# Patient Record
Sex: FEMALE | Race: BLACK OR AFRICAN AMERICAN | NOT HISPANIC OR LATINO | Employment: OTHER | URBAN - METROPOLITAN AREA
[De-identification: names, ages, dates, MRNs, and addresses within clinical notes are randomized per-mention and may not be internally consistent; named-entity substitution may affect disease eponyms.]

---

## 2020-03-17 ENCOUNTER — HOSPITAL ENCOUNTER (EMERGENCY)
Facility: HOSPITAL | Age: 55
Discharge: HOME | End: 2020-03-17
Attending: EMERGENCY MEDICINE | Admitting: EMERGENCY MEDICINE

## 2020-03-17 VITALS
HEIGHT: 67 IN | OXYGEN SATURATION: 99 % | BODY MASS INDEX: 39.08 KG/M2 | WEIGHT: 249 LBS | TEMPERATURE: 98.4 F | HEART RATE: 60 BPM | SYSTOLIC BLOOD PRESSURE: 158 MMHG | DIASTOLIC BLOOD PRESSURE: 98 MMHG | RESPIRATION RATE: 18 BRPM

## 2020-03-17 DIAGNOSIS — T78.3XXA ACE INHIBITOR-AGGRAVATED ANGIOEDEMA, INITIAL ENCOUNTER: Primary | ICD-10-CM

## 2020-03-17 DIAGNOSIS — T46.4X5A ACE INHIBITOR-AGGRAVATED ANGIOEDEMA, INITIAL ENCOUNTER: Primary | ICD-10-CM

## 2020-03-17 PROBLEM — I49.9 IRREGULAR HEART BEAT: Status: ACTIVE | Noted: 2020-03-17

## 2020-03-17 PROBLEM — E11.22 TYPE 2 DIABETES MELLITUS WITH CHRONIC KIDNEY DISEASE, WITHOUT LONG-TERM CURRENT USE OF INSULIN (HCC): Status: ACTIVE | Noted: 2020-03-17

## 2020-03-17 PROBLEM — Z98.84 HX OF GASTRIC BYPASS: Status: ACTIVE | Noted: 2020-03-17

## 2020-03-17 PROBLEM — K76.9 LIVER DISEASE, UNSPECIFIED: Status: ACTIVE | Noted: 2020-03-17

## 2020-03-17 PROBLEM — Z90.13 HISTORY OF MASTECTOMY, BILATERAL: Status: ACTIVE | Noted: 2020-03-17

## 2020-03-17 PROBLEM — I10 ESSENTIAL HYPERTENSION: Status: ACTIVE | Noted: 2020-03-17

## 2020-03-17 PROBLEM — Z85.3 HISTORY OF BREAST CANCER: Status: ACTIVE | Noted: 2020-03-17

## 2020-03-17 PROBLEM — Z98.890 HISTORY OF BACK SURGERY: Status: ACTIVE | Noted: 2020-03-17

## 2020-03-17 LAB
ALBUMIN SERPL-MCNC: 4 G/DL (ref 3.5–5.2)
ALBUMIN/GLOB SERPL: 1.3 G/DL
ALP SERPL-CCNC: 114 U/L (ref 39–117)
ALT SERPL W P-5'-P-CCNC: 17 U/L (ref 1–33)
ANION GAP SERPL CALCULATED.3IONS-SCNC: 11.1 MMOL/L (ref 5–15)
ANION GAP SERPL CALCULATED.3IONS-SCNC: 12 MMOL/L (ref 5–15)
AST SERPL-CCNC: 21 U/L (ref 1–32)
BASOPHILS # BLD AUTO: 0.02 10*3/MM3 (ref 0–0.2)
BASOPHILS NFR BLD AUTO: 0.3 % (ref 0–1.5)
BILIRUB SERPL-MCNC: 0.3 MG/DL (ref 0.2–1.2)
BUN BLD-MCNC: 16 MG/DL (ref 6–20)
BUN BLD-MCNC: 18 MG/DL (ref 6–20)
BUN/CREAT SERPL: 12.4 (ref 7–25)
BUN/CREAT SERPL: 15.4 (ref 7–25)
CALCIUM SPEC-SCNC: 8.9 MG/DL (ref 8.6–10.5)
CALCIUM SPEC-SCNC: 8.9 MG/DL (ref 8.6–10.5)
CHLORIDE SERPL-SCNC: 109 MMOL/L (ref 98–107)
CHLORIDE SERPL-SCNC: 109 MMOL/L (ref 98–107)
CO2 SERPL-SCNC: 22 MMOL/L (ref 22–29)
CO2 SERPL-SCNC: 22.9 MMOL/L (ref 22–29)
CREAT BLD-MCNC: 1.17 MG/DL (ref 0.57–1)
CREAT BLD-MCNC: 1.29 MG/DL (ref 0.57–1)
DEPRECATED RDW RBC AUTO: 41.8 FL (ref 37–54)
DEPRECATED RDW RBC AUTO: 42.5 FL (ref 37–54)
EOSINOPHIL # BLD AUTO: 0.16 10*3/MM3 (ref 0–0.4)
EOSINOPHIL NFR BLD AUTO: 2.5 % (ref 0.3–6.2)
ERYTHROCYTE [DISTWIDTH] IN BLOOD BY AUTOMATED COUNT: 15 % (ref 12.3–15.4)
ERYTHROCYTE [DISTWIDTH] IN BLOOD BY AUTOMATED COUNT: 15.1 % (ref 12.3–15.4)
GFR SERPL CREATININE-BSD FRML MDRD: 52 ML/MIN/1.73
GFR SERPL CREATININE-BSD FRML MDRD: 59 ML/MIN/1.73
GLOBULIN UR ELPH-MCNC: 3 GM/DL
GLUCOSE BLD-MCNC: 105 MG/DL (ref 65–99)
GLUCOSE BLD-MCNC: 126 MG/DL (ref 65–99)
GLUCOSE BLDC GLUCOMTR-MCNC: 187 MG/DL (ref 70–130)
GLUCOSE BLDC GLUCOMTR-MCNC: 223 MG/DL (ref 70–130)
HCT VFR BLD AUTO: 33.6 % (ref 34–46.6)
HCT VFR BLD AUTO: 35.9 % (ref 34–46.6)
HGB BLD-MCNC: 10.6 G/DL (ref 12–15.9)
HGB BLD-MCNC: 11 G/DL (ref 12–15.9)
IMM GRANULOCYTES # BLD AUTO: 0.02 10*3/MM3 (ref 0–0.05)
IMM GRANULOCYTES NFR BLD AUTO: 0.3 % (ref 0–0.5)
LYMPHOCYTES # BLD AUTO: 1.87 10*3/MM3 (ref 0.7–3.1)
LYMPHOCYTES NFR BLD AUTO: 29.6 % (ref 19.6–45.3)
MCH RBC QN AUTO: 24.1 PG (ref 26.6–33)
MCH RBC QN AUTO: 24.5 PG (ref 26.6–33)
MCHC RBC AUTO-ENTMCNC: 30.6 G/DL (ref 31.5–35.7)
MCHC RBC AUTO-ENTMCNC: 31.5 G/DL (ref 31.5–35.7)
MCV RBC AUTO: 77.6 FL (ref 79–97)
MCV RBC AUTO: 78.7 FL (ref 79–97)
MONOCYTES # BLD AUTO: 0.63 10*3/MM3 (ref 0.1–0.9)
MONOCYTES NFR BLD AUTO: 10 % (ref 5–12)
NEUTROPHILS # BLD AUTO: 3.61 10*3/MM3 (ref 1.7–7)
NEUTROPHILS NFR BLD AUTO: 57.3 % (ref 42.7–76)
NRBC BLD AUTO-RTO: 0 /100 WBC (ref 0–0.2)
PLATELET # BLD AUTO: 255 10*3/MM3 (ref 140–450)
PLATELET # BLD AUTO: 267 10*3/MM3 (ref 140–450)
PMV BLD AUTO: 10.5 FL (ref 6–12)
PMV BLD AUTO: 11.5 FL (ref 6–12)
POTASSIUM BLD-SCNC: 3.9 MMOL/L (ref 3.5–5.2)
POTASSIUM BLD-SCNC: 4.2 MMOL/L (ref 3.5–5.2)
PROT SERPL-MCNC: 7 G/DL (ref 6–8.5)
RBC # BLD AUTO: 4.33 10*6/MM3 (ref 3.77–5.28)
RBC # BLD AUTO: 4.56 10*6/MM3 (ref 3.77–5.28)
SODIUM BLD-SCNC: 143 MMOL/L (ref 136–145)
SODIUM BLD-SCNC: 143 MMOL/L (ref 136–145)
WBC NRBC COR # BLD: 5.91 10*3/MM3 (ref 3.4–10.8)
WBC NRBC COR # BLD: 6.31 10*3/MM3 (ref 3.4–10.8)

## 2020-03-17 PROCEDURE — 85025 COMPLETE CBC W/AUTO DIFF WBC: CPT | Performed by: EMERGENCY MEDICINE

## 2020-03-17 PROCEDURE — 96375 TX/PRO/DX INJ NEW DRUG ADDON: CPT

## 2020-03-17 PROCEDURE — 96374 THER/PROPH/DIAG INJ IV PUSH: CPT

## 2020-03-17 PROCEDURE — 25010000002 METHYLPREDNISOLONE PER 125 MG: Performed by: EMERGENCY MEDICINE

## 2020-03-17 PROCEDURE — 80053 COMPREHEN METABOLIC PANEL: CPT | Performed by: EMERGENCY MEDICINE

## 2020-03-17 PROCEDURE — 63710000001 INSULIN LISPRO (HUMAN) PER 5 UNITS: Performed by: NURSE PRACTITIONER

## 2020-03-17 PROCEDURE — 82962 GLUCOSE BLOOD TEST: CPT

## 2020-03-17 PROCEDURE — 99285 EMERGENCY DEPT VISIT HI MDM: CPT

## 2020-03-17 PROCEDURE — G0378 HOSPITAL OBSERVATION PER HR: HCPCS

## 2020-03-17 PROCEDURE — 25010000002 METHYLPREDNISOLONE PER 40 MG: Performed by: NURSE PRACTITIONER

## 2020-03-17 PROCEDURE — 85027 COMPLETE CBC AUTOMATED: CPT | Performed by: NURSE PRACTITIONER

## 2020-03-17 RX ORDER — SODIUM CHLORIDE 0.9 % (FLUSH) 0.9 %
10 SYRINGE (ML) INJECTION AS NEEDED
Status: DISCONTINUED | OUTPATIENT
Start: 2020-03-17 | End: 2020-03-17 | Stop reason: HOSPADM

## 2020-03-17 RX ORDER — ATORVASTATIN CALCIUM 10 MG/1
10 TABLET, FILM COATED ORAL DAILY
COMMUNITY

## 2020-03-17 RX ORDER — BACLOFEN 10 MG/1
10 TABLET ORAL 3 TIMES DAILY
COMMUNITY

## 2020-03-17 RX ORDER — ONDANSETRON 2 MG/ML
4 INJECTION INTRAMUSCULAR; INTRAVENOUS EVERY 6 HOURS PRN
Status: DISCONTINUED | OUTPATIENT
Start: 2020-03-17 | End: 2020-03-17 | Stop reason: HOSPADM

## 2020-03-17 RX ORDER — TRAZODONE HYDROCHLORIDE 50 MG/1
50 TABLET ORAL NIGHTLY PRN
COMMUNITY

## 2020-03-17 RX ORDER — METOPROLOL TARTRATE 50 MG/1
50 TABLET, FILM COATED ORAL EVERY 12 HOURS SCHEDULED
Status: DISCONTINUED | OUTPATIENT
Start: 2020-03-17 | End: 2020-03-17 | Stop reason: HOSPADM

## 2020-03-17 RX ORDER — PREDNISONE 20 MG/1
TABLET ORAL
Qty: 3 TABLET | Refills: 0 | Status: SHIPPED | OUTPATIENT
Start: 2020-03-17 | End: 2020-03-17 | Stop reason: SDUPTHER

## 2020-03-17 RX ORDER — METHYLPREDNISOLONE SODIUM SUCCINATE 125 MG/2ML
125 INJECTION, POWDER, LYOPHILIZED, FOR SOLUTION INTRAMUSCULAR; INTRAVENOUS ONCE
Status: COMPLETED | OUTPATIENT
Start: 2020-03-17 | End: 2020-03-17

## 2020-03-17 RX ORDER — LISINOPRIL 40 MG/1
40 TABLET ORAL DAILY
COMMUNITY
End: 2020-03-17

## 2020-03-17 RX ORDER — DIPHENHYDRAMINE HCL 25 MG
25 CAPSULE ORAL EVERY 6 HOURS PRN
Start: 2020-03-17

## 2020-03-17 RX ORDER — AMLODIPINE BESYLATE 5 MG/1
5 TABLET ORAL DAILY
COMMUNITY

## 2020-03-17 RX ORDER — DEXTROSE MONOHYDRATE 25 G/50ML
25 INJECTION, SOLUTION INTRAVENOUS
Status: DISCONTINUED | OUTPATIENT
Start: 2020-03-17 | End: 2020-03-17 | Stop reason: HOSPADM

## 2020-03-17 RX ORDER — TOPIRAMATE 50 MG/1
50 TABLET, FILM COATED ORAL 2 TIMES DAILY
COMMUNITY

## 2020-03-17 RX ORDER — BACLOFEN 10 MG/1
10 TABLET ORAL 3 TIMES DAILY
Status: DISCONTINUED | OUTPATIENT
Start: 2020-03-17 | End: 2020-03-17 | Stop reason: HOSPADM

## 2020-03-17 RX ORDER — BISACODYL 5 MG/1
5 TABLET, DELAYED RELEASE ORAL DAILY PRN
Status: DISCONTINUED | OUTPATIENT
Start: 2020-03-17 | End: 2020-03-17 | Stop reason: HOSPADM

## 2020-03-17 RX ORDER — PANTOPRAZOLE SODIUM 40 MG/1
40 TABLET, DELAYED RELEASE ORAL DAILY
COMMUNITY

## 2020-03-17 RX ORDER — HYDRALAZINE HYDROCHLORIDE 25 MG/1
25 TABLET, FILM COATED ORAL 2 TIMES DAILY
Status: DISCONTINUED | OUTPATIENT
Start: 2020-03-17 | End: 2020-03-17 | Stop reason: HOSPADM

## 2020-03-17 RX ORDER — DIPHENHYDRAMINE HCL 25 MG
25 CAPSULE ORAL EVERY 6 HOURS PRN
Status: DISCONTINUED | OUTPATIENT
Start: 2020-03-17 | End: 2020-03-17 | Stop reason: HOSPADM

## 2020-03-17 RX ORDER — FAMOTIDINE 20 MG/1
20 TABLET, FILM COATED ORAL
Status: DISCONTINUED | OUTPATIENT
Start: 2020-03-17 | End: 2020-03-17 | Stop reason: HOSPADM

## 2020-03-17 RX ORDER — ATORVASTATIN CALCIUM 10 MG/1
10 TABLET, FILM COATED ORAL DAILY
Status: DISCONTINUED | OUTPATIENT
Start: 2020-03-17 | End: 2020-03-17 | Stop reason: HOSPADM

## 2020-03-17 RX ORDER — NICOTINE POLACRILEX 4 MG
15 LOZENGE BUCCAL
Status: DISCONTINUED | OUTPATIENT
Start: 2020-03-17 | End: 2020-03-17 | Stop reason: HOSPADM

## 2020-03-17 RX ORDER — SERTRALINE HYDROCHLORIDE 100 MG/1
150 TABLET, FILM COATED ORAL DAILY
COMMUNITY

## 2020-03-17 RX ORDER — SERTRALINE HYDROCHLORIDE 100 MG/1
100 TABLET, FILM COATED ORAL DAILY
Status: DISCONTINUED | OUTPATIENT
Start: 2020-03-17 | End: 2020-03-17 | Stop reason: HOSPADM

## 2020-03-17 RX ORDER — HYDRALAZINE HYDROCHLORIDE 25 MG/1
25 TABLET, FILM COATED ORAL 2 TIMES DAILY
COMMUNITY
End: 2020-03-17 | Stop reason: SDUPTHER

## 2020-03-17 RX ORDER — TRAZODONE HYDROCHLORIDE 50 MG/1
50 TABLET ORAL NIGHTLY
Status: DISCONTINUED | OUTPATIENT
Start: 2020-03-17 | End: 2020-03-17 | Stop reason: HOSPADM

## 2020-03-17 RX ORDER — ARIPIPRAZOLE 10 MG/1
10 TABLET ORAL DAILY
Status: DISCONTINUED | OUTPATIENT
Start: 2020-03-17 | End: 2020-03-17 | Stop reason: HOSPADM

## 2020-03-17 RX ORDER — PREDNISONE 20 MG/1
40 TABLET ORAL
Status: DISCONTINUED | OUTPATIENT
Start: 2020-03-18 | End: 2020-03-17 | Stop reason: HOSPADM

## 2020-03-17 RX ORDER — METHYLPREDNISOLONE SODIUM SUCCINATE 40 MG/ML
40 INJECTION, POWDER, LYOPHILIZED, FOR SOLUTION INTRAMUSCULAR; INTRAVENOUS EVERY 12 HOURS
Status: DISCONTINUED | OUTPATIENT
Start: 2020-03-17 | End: 2020-03-17 | Stop reason: HOSPADM

## 2020-03-17 RX ORDER — ARIPIPRAZOLE 10 MG/1
10 TABLET ORAL DAILY
COMMUNITY

## 2020-03-17 RX ORDER — SODIUM CHLORIDE 0.9 % (FLUSH) 0.9 %
10 SYRINGE (ML) INJECTION EVERY 12 HOURS SCHEDULED
Status: DISCONTINUED | OUTPATIENT
Start: 2020-03-17 | End: 2020-03-17 | Stop reason: HOSPADM

## 2020-03-17 RX ORDER — GABAPENTIN 600 MG/1
600 TABLET ORAL 3 TIMES DAILY
COMMUNITY

## 2020-03-17 RX ORDER — FAMOTIDINE 20 MG/1
20 TABLET, FILM COATED ORAL
Start: 2020-03-17

## 2020-03-17 RX ORDER — FAMOTIDINE 10 MG/ML
20 INJECTION, SOLUTION INTRAVENOUS ONCE
Status: COMPLETED | OUTPATIENT
Start: 2020-03-17 | End: 2020-03-17

## 2020-03-17 RX ORDER — HYDRALAZINE HYDROCHLORIDE 50 MG/1
50 TABLET, FILM COATED ORAL 2 TIMES DAILY
Qty: 30 TABLET | Refills: 0 | Status: SHIPPED | OUTPATIENT
Start: 2020-03-17

## 2020-03-17 RX ORDER — TOPIRAMATE 50 MG/1
50 TABLET, FILM COATED ORAL 2 TIMES DAILY
Status: DISCONTINUED | OUTPATIENT
Start: 2020-03-17 | End: 2020-03-17 | Stop reason: HOSPADM

## 2020-03-17 RX ORDER — ALUMINA, MAGNESIA, AND SIMETHICONE 2400; 2400; 240 MG/30ML; MG/30ML; MG/30ML
15 SUSPENSION ORAL EVERY 6 HOURS PRN
Status: DISCONTINUED | OUTPATIENT
Start: 2020-03-17 | End: 2020-03-17 | Stop reason: HOSPADM

## 2020-03-17 RX ORDER — PREDNISONE 20 MG/1
TABLET ORAL
Qty: 4 TABLET | Refills: 0 | Status: SHIPPED | OUTPATIENT
Start: 2020-03-17 | End: 2020-03-20

## 2020-03-17 RX ORDER — AMLODIPINE BESYLATE 5 MG/1
5 TABLET ORAL DAILY
Status: DISCONTINUED | OUTPATIENT
Start: 2020-03-17 | End: 2020-03-17 | Stop reason: HOSPADM

## 2020-03-17 RX ORDER — METOPROLOL TARTRATE 50 MG/1
50 TABLET, FILM COATED ORAL 2 TIMES DAILY
COMMUNITY

## 2020-03-17 RX ORDER — ONDANSETRON 4 MG/1
4 TABLET, FILM COATED ORAL EVERY 6 HOURS PRN
Status: DISCONTINUED | OUTPATIENT
Start: 2020-03-17 | End: 2020-03-17 | Stop reason: HOSPADM

## 2020-03-17 RX ADMIN — ARIPIPRAZOLE 10 MG: 10 TABLET ORAL at 14:02

## 2020-03-17 RX ADMIN — FAMOTIDINE 20 MG: 20 TABLET, FILM COATED ORAL at 11:15

## 2020-03-17 RX ADMIN — INSULIN LISPRO 4 UNITS: 100 INJECTION, SOLUTION INTRAVENOUS; SUBCUTANEOUS at 12:47

## 2020-03-17 RX ADMIN — SODIUM CHLORIDE, PRESERVATIVE FREE 10 ML: 5 INJECTION INTRAVENOUS at 11:16

## 2020-03-17 RX ADMIN — INSULIN LISPRO 2 UNITS: 100 INJECTION, SOLUTION INTRAVENOUS; SUBCUTANEOUS at 17:06

## 2020-03-17 RX ADMIN — METHYLPREDNISOLONE SODIUM SUCCINATE 125 MG: 125 INJECTION, POWDER, FOR SOLUTION INTRAMUSCULAR; INTRAVENOUS at 03:35

## 2020-03-17 RX ADMIN — METHYLPREDNISOLONE SODIUM SUCCINATE 40 MG: 40 INJECTION, POWDER, FOR SOLUTION INTRAMUSCULAR; INTRAVENOUS at 11:15

## 2020-03-17 RX ADMIN — ATORVASTATIN CALCIUM 10 MG: 10 TABLET, FILM COATED ORAL at 14:01

## 2020-03-17 RX ADMIN — AMLODIPINE BESYLATE 5 MG: 5 TABLET ORAL at 14:01

## 2020-03-17 RX ADMIN — BACLOFEN 10 MG: 10 TABLET ORAL at 14:04

## 2020-03-17 RX ADMIN — TOPIRAMATE 50 MG: 50 TABLET, FILM COATED ORAL at 14:02

## 2020-03-17 RX ADMIN — METOPROLOL TARTRATE 50 MG: 50 TABLET, FILM COATED ORAL at 14:01

## 2020-03-17 RX ADMIN — FAMOTIDINE 20 MG: 20 TABLET, FILM COATED ORAL at 17:06

## 2020-03-17 RX ADMIN — FAMOTIDINE 20 MG: 10 INJECTION INTRAVENOUS at 03:35

## 2020-03-17 RX ADMIN — SERTRALINE 100 MG: 100 TABLET, FILM COATED ORAL at 14:01

## 2020-03-17 RX ADMIN — HYDRALAZINE HYDROCHLORIDE 25 MG: 25 TABLET, FILM COATED ORAL at 14:01

## 2020-03-17 NOTE — H&P
Patient Name:  Sidra Valencia  YOB: 1965  MRN:  [id number]  Admit Date:  3/17/2020  Patient Care Team:  Provider, No Known as PCP - General      Subjective   History Present Illness     Chief Complaint   Patient presents with   • Angioedema     History of Present Illness   Ms. Valencia is a 54 y.o. non-smoker with a history of DM2, CKD, gastric bypass, obesity, irregular heart beat, back surgery, breast cancer s/p bilateral mastectomy, HTN and liver disease that presents to [facility] complaining of angioedema. The patient states she has a history of high blood pressure and has been on lisinopril for the last 6 years. She denies any prior history of reaction to this medication. She states her and her fiance were on their way to [place] from [place] yesterday when she had a sudden onset of upper lip tingling and swelling after eating tuna. She has eaten tuna in the past without reaction, but admits she does not eat a lot of seafood. She did start to get sick after eating it and ultimately threw up. She states the abnormal lip sensation started shortly thereafter. She denies any dyspnea or cough, but does report increased nasal drainage as well as feeling as if her tonsils were swollen. She denies any recent illness or fever/chills. She was in the process of relocating to [place] and has not established any new medical care at this time.    The patient arrived in the ED and was given Benadryl as well as Pepcid and Solumedrol. She has not had any respiratory compromise. She states her numbness and tingling have subsided, but she still has significant upper lip swelling. She denies any chest pain, nausea, vomiting, abdominal pain or diarrhea. She has a medical history as listed above and reports taking metoprolol, amlodipine as well as lisinopril for hypertension. She states she has kidney and liver disease, but is not the best historian regarding the staging of her disease. She states  medications as well as her obesity has caused her liver disease, but LFTs were normal on admission. She is diet-controlled with her diabetes and has a history of gastric bypass. She denies any other complaints at this time.    In the ED, lab work showed a creatinine of 1.29 and now 1.17 this morning. Her hemoglobin was 10.6. She is being admitted for observation of her angioedema at this time.     Review of Systems   Constitutional: Negative for activity change, appetite change, chills, fatigue and fever.   HENT: Positive for facial swelling and rhinorrhea. Negative for congestion and ear pain.    Eyes: Negative for pain and discharge.   Respiratory: Negative for cough, choking, chest tightness and shortness of breath.    Cardiovascular: Negative for chest pain and leg swelling.   Gastrointestinal: Positive for nausea and vomiting. Negative for abdominal distention, abdominal pain and diarrhea.   Endocrine: Negative for cold intolerance and heat intolerance.   Genitourinary: Negative for difficulty urinating and dysuria.   Musculoskeletal: Negative for arthralgias, back pain and gait problem.   Skin: Negative for color change and pallor.   Neurological: Positive for numbness.   Psychiatric/Behavioral: Negative for confusion. The patient is not nervous/anxious.       Personal History     Past Medical History:   Diagnosis Date   • Cancer (CMS/HCC)    • Chronic kidney disease    • Diabetes mellitus (CMS/HCC)    • Hypertension    • Infectious viral hepatitis    • Obesity      Past Surgical History:   Procedure Laterality Date   • BREAST SURGERY     • COLON SURGERY     • SPINE SURGERY       Family History   Problem Relation Age of Onset   • Diabetes Mother    • Heart disease Mother    • Hypertension Mother    • Diabetes Father    • Heart disease Father    • Hypertension Father      Social History     Tobacco Use   • Smoking status: Never Smoker   Substance Use Topics   • Alcohol use: Yes     Comment: occasional   • Drug  use: Never       (Not in a hospital admission)  Allergies:    Allergies   Allergen Reactions   • Aspirin Other (See Comments)     Bleeding    • Codeine Nausea And Vomiting and Dizziness   • Tape Other (See Comments)     Surgical Burns    • Tylenol [Acetaminophen] Other (See Comments)     Increasing of liver enzymes and pain       Objective    Objective     Vital Signs  Temp:  [97.3 °F (36.3 °C)] 97.3 °F (36.3 °C)  Heart Rate:  [60-73] 60  Resp:  [18] 18  BP: (155-178)/() 178/107  SpO2:  [99 %-100 %] 100 %  on   ;   Device (Oxygen Therapy): room air  Body mass index is 39 kg/m².    Physical Exam   Constitutional: She is oriented to person, place, and time. She appears well-developed and well-nourished. No distress.   HENT:   Head: Normocephalic and atraumatic.   Nose: Nose normal.   +angioedema. Severe upper lip edema noted. Right tonsil swelling. +patent airway noted.   Eyes: Conjunctivae are normal. Right eye exhibits no discharge. Left eye exhibits no discharge.   Neck: Normal range of motion. Neck supple.   Cardiovascular: Normal rate, regular rhythm, normal heart sounds and intact distal pulses.   Pulmonary/Chest: Effort normal and breath sounds normal. No respiratory distress.   Abdominal: Soft. Bowel sounds are normal. She exhibits no distension. There is no tenderness.   Musculoskeletal: Normal range of motion. She exhibits no edema or tenderness.   Neurological: She is alert and oriented to person, place, and time. She exhibits normal muscle tone. Coordination normal.   Skin: Skin is warm and dry. She is not diaphoretic. No erythema.   Psychiatric: She has a normal mood and affect. Her behavior is normal.   Nursing note and vitals reviewed.     Results Review:  I reviewed the patient's new clinical results.  I reviewed the patient's new imaging results and agree with the interpretation.  I reviewed the patient's other test results and agree with the interpretation  I personally viewed and interpreted  the patient's EKG/Telemetry data    Lab Results (last 24 hours)     Procedure Component Value Units Date/Time    CBC & Differential [[id number]] Collected:  03/17/20 0326    Specimen:  Blood Updated:  03/17/20 0337    Narrative:       The following orders were created for panel order CBC & Differential.  Procedure                               Abnormality         Status                     ---------                               -----------         ------                     CBC Auto Differential[[id number]]        Abnormal            Final result                 Please view results for these tests on the individual orders.    Comprehensive Metabolic Panel [[id number]]  (Abnormal) Collected:  03/17/20 0326    Specimen:  Blood Updated:  03/17/20 0401     Glucose 105 mg/dL      BUN 16 mg/dL      Creatinine 1.29 mg/dL      Sodium 143 mmol/L      Potassium 4.2 mmol/L      Chloride 109 mmol/L      CO2 22.0 mmol/L      Calcium 8.9 mg/dL      Total Protein 7.0 g/dL      Albumin 4.00 g/dL      ALT (SGPT) 17 U/L      AST (SGOT) 21 U/L      Alkaline Phosphatase 114 U/L      Total Bilirubin 0.3 mg/dL      eGFR  African Amer 52 mL/min/1.73      Globulin 3.0 gm/dL      A/G Ratio 1.3 g/dL      BUN/Creatinine Ratio 12.4     Anion Gap 12.0 mmol/L     Narrative:       GFR Normal >60  Chronic Kidney Disease <60  Kidney Failure <15      CBC Auto Differential [[id number]]  (Abnormal) Collected:  03/17/20 0326    Specimen:  Blood Updated:  03/17/20 0337     WBC 6.31 10*3/mm3      RBC 4.56 10*6/mm3      Hemoglobin 11.0 g/dL      Hematocrit 35.9 %      MCV 78.7 fL      MCH 24.1 pg      MCHC 30.6 g/dL      RDW 15.0 %      RDW-SD 42.5 fl      MPV 10.5 fL      Platelets 255 10*3/mm3      Neutrophil % 57.3 %      Lymphocyte % 29.6 %      Monocyte % 10.0 %      Eosinophil % 2.5 %      Basophil % 0.3 %      Immature Grans % 0.3 %      Neutrophils, Absolute 3.61 10*3/mm3      Lymphocytes, Absolute 1.87 10*3/mm3      Monocytes, Absolute 0.63 10*3/mm3       Eosinophils, Absolute 0.16 10*3/mm3      Basophils, Absolute 0.02 10*3/mm3      Immature Grans, Absolute 0.02 10*3/mm3      nRBC 0.0 /100 WBC     Basic Metabolic Panel [[id number]]  (Abnormal) Collected:  03/17/20 0530    Specimen:  Blood Updated:  03/17/20 0607     Glucose 126 mg/dL      BUN 18 mg/dL      Creatinine 1.17 mg/dL      Sodium 143 mmol/L      Potassium 3.9 mmol/L      Chloride 109 mmol/L      CO2 22.9 mmol/L      Calcium 8.9 mg/dL      eGFR  African Amer 59 mL/min/1.73      BUN/Creatinine Ratio 15.4     Anion Gap 11.1 mmol/L     Narrative:       GFR Normal >60  Chronic Kidney Disease <60  Kidney Failure <15      CBC (No Diff) [[id number]]  (Abnormal) Collected:  03/17/20 0530    Specimen:  Blood Updated:  03/17/20 0549     WBC 5.91 10*3/mm3      RBC 4.33 10*6/mm3      Hemoglobin 10.6 g/dL      Hematocrit 33.6 %      MCV 77.6 fL      MCH 24.5 pg      MCHC 31.5 g/dL      RDW 15.1 %      RDW-SD 41.8 fl      MPV 11.5 fL      Platelets 267 10*3/mm3         Assessment/Plan     Active Hospital Problems    Diagnosis POA   • **ACE inhibitor-aggravated angioedema, initial encounter [T78.3XXA, T46.4X5A] Yes   • Essential hypertension [I10] Yes   • Type 2 diabetes mellitus with chronic kidney disease, without long-term current use of insulin (CMS/[facility]) [E11.22] Yes   • Hx of gastric bypass [Z98.84] Not Applicable   • History of back surgery [Z98.890] Not Applicable   • Liver disease, unspecified [K76.9] Yes   • History of breast cancer [Z85.3] Not Applicable   • History of mastectomy, bilateral [Z90.13] Not Applicable   • Irregular heart beat [I49.9] Yes     ACE-inhibitor-aggravated angioedema  -Angioedema most likely secondary to lisinopril use.   -Discontinue ACE-inhibitors indefinitely. Discussed with patient.  -Given one dose IV Benadryl, Pepcid and Solumedrol in ED.  -Continue IV steroids today with Solumedrol 40 mg IV Q12 and then oral prednisone for another 3 days thereafter. Pepcid PO BID for now.  Benadryl as needed.  -Monitor for respiratory compromise.    HTN  -Stop lisinopril.  -Resume home medications of amlodipine and metoprolol once addressed by staff. Monitor pressures. May need to add another agent or increase dosing of current. Will need to establish medical care once in [place].    DM2 with CKD  -Monitor AC and HS. SSI as needed.  -Check hemoglobin A1c.   -Anticipate elevated glucose related to steroid use, but benefits outweigh risks at this time.   -Unclear staging for CKD as patient has no medical records on file. Monitor renal function for now. Cr ok at 1.17.    Liver disease  -? Etiology as no medical records for review.  -LFTs normal on admission. Monitor for now.    History of irregular heart beat  -She is unsure the diagnosis of this. No established Cardiologist.  -Monitor heart rhythm. Sounds regular on exam. BB continued once able.      *Home medications not in system. Will review once able.    · I discussed the patients findings and my recommendations with patient.    VTE Prophylaxis - SCDs.  Code Status - Full code.       SY Martinez  [facility]  03/17/20  09:52

## 2020-03-17 NOTE — ED NOTES
Pt is on Lisinopril, noticed her lips swelling about 4 hours ago. Pt took one OTC benadryl. Denies rash, denies SOB.      Uyen Pedraza, RN  03/17/20 0250

## 2020-03-17 NOTE — DISCHARGE SUMMARY
Patient Name: Sidra Valencia  : 1965  MRN: [id number]    Date of Admission: 3/17/2020  Date of Discharge:  3/17/2020  Primary Care Physician: Provider, No Known      Chief Complaint:   Angioedema      Discharge Diagnoses     Active Hospital Problems    Diagnosis  POA   • **ACE inhibitor-aggravated angioedema, initial encounter [T78.3XXA, T46.4X5A]  Yes   • Essential hypertension [I10]  Yes   • Type 2 diabetes mellitus with chronic kidney disease, without long-term current use of insulin (CMS/HCC) [E11.22]  Yes   • Hx of gastric bypass [Z98.84]  Not Applicable   • History of back surgery [Z98.890]  Not Applicable   • Liver disease, unspecified [K76.9]  Yes   • History of breast cancer [Z85.3]  Not Applicable   • History of mastectomy, bilateral [Z90.13]  Not Applicable   • Irregular heart beat [I49.9]  Yes      Resolved Hospital Problems   No resolved problems to display.        Hospital Course   Patient is a 54-year-old -American female with history of hypertension and diabetes, breast cancer, among other issues who is on her way to [place] with her fiancé.  Over the last 24 hours she developed severe swelling of the upper lip as well as feeling some swelling of her right tonsil area.  She came to ER where she was diagnosed with angioedema, likely due to lisinopril which patient says she has been on for 6 or 7 years.  She was admitted for observation regarding this issue.  I am seeing her in the afternoon after she has been here for over 12 hours and she is doing much better.  She showed me a picture of her lip taken early this morning and it was dramatically worse than it looks now.      She is requesting discharge because her fiancé is having some anxiety issues and has a history of psychiatric problems.  They are hoping to get to [place] by tomorrow evening and would like to actually start driving tonight.  I think she is safe to do this as her swelling is clearly much improved.  She will complete  a short course of prednisone and can also continue taking Benadryl and Pepcid.  I increased her hydralazine slightly to compensate for the loss of the lisinopril although interestingly on her medication reconciliation there does not appear to be lisinopril at this time.  It has been listed as an allergy and has been communicated to her to list this as an allergy in the indefinite future.        Day of Discharge       Physical Exam:  Temp:  [97.3 °F (36.3 °C)-98.4 °F (36.9 °C)] 98.4 °F (36.9 °C)  Heart Rate:  [60-87] 60  Resp:  [16-18] 18  BP: (143-178)/() 158/98  Body mass index is 39 kg/m².  Physical Exam   Constitutional:   Obese   HENT:   Mouth/Throat: Oropharynx is clear and moist. No oropharyngeal exudate.   Left upper lip still slightly swollen but improved as discussed above   Neck: Neck supple. No JVD present.   No tenderness   Cardiovascular: Normal rate, regular rhythm and normal heart sounds.   No murmur heard.  Pulmonary/Chest: Effort normal and breath sounds normal. No respiratory distress.   Abdominal: Soft. Bowel sounds are normal. There is no tenderness.   Musculoskeletal: She exhibits no edema.   Lymphadenopathy:     She has no cervical adenopathy.   Skin: Skin is warm and dry. No rash noted.   Psychiatric: She has a normal mood and affect.   Vitals reviewed.      Consultants     Consult Orders (all) (From admission, onward)     Start     Ordered    03/17/20 0445  [facility] (on-call MD unless specified) Details  Once     Specialty:  Hospitalist  Provider:  (Not yet assigned)    03/17/20 0445              Procedures         Imaging Results (All)     None              Pertinent Labs     Results from last 7 days   Lab Units 03/17/20  0530 03/17/20  0326   WBC 10*3/mm3 5.91 6.31   HEMOGLOBIN g/dL 10.6* 11.0*   PLATELETS 10*3/mm3 267 255     Results from last 7 days   Lab Units 03/17/20  0530 03/17/20  0326   SODIUM mmol/L 143 143   POTASSIUM mmol/L 3.9 4.2   CHLORIDE mmol/L 109* 109*   CO2 mmol/L 22.9  22.0   BUN mg/dL 18 16   CREATININE mg/dL 1.17* 1.29*   GLUCOSE mg/dL 126* 105*   Estimated Creatinine Clearance: 72.2 mL/min (A) (by C-G formula based on SCr of 1.17 mg/dL (H)).  Results from last 7 days   Lab Units 03/17/20  0326   ALBUMIN g/dL 4.00   BILIRUBIN mg/dL 0.3   ALK PHOS U/L 114   AST (SGOT) U/L 21   ALT (SGPT) U/L 17     Results from last 7 days   Lab Units 03/17/20  0530 03/17/20  0326   CALCIUM mg/dL 8.9 8.9   ALBUMIN g/dL  --  4.00               Invalid input(s): LDLCALC        Test Results Pending at Discharge       Discharge Details        Discharge Medications      New Medications      Instructions Start Date   diphenhydrAMINE 25 mg capsule  Commonly known as:  BENADRYL   25 mg, Oral, Every 6 Hours PRN      famotidine 20 MG tablet  Commonly known as:  PEPCID   20 mg, Oral, 2 Times Daily Before Meals      predniSONE 20 MG tablet  Commonly known as:  DELTASONE   Take 2 tablets by mouth daily for 1 day, THEN 1 tablet daily for 2 days.   Start Date:  March 17, 2020        Changes to Medications      Instructions Start Date   hydrALAZINE 50 MG tablet  Commonly known as:  APRESOLINE  What changed:    · medication strength  · how much to take   50 mg, Oral, 2 Times Daily         Continue These Medications      Instructions Start Date   amLODIPine 5 MG tablet  Commonly known as:  NORVASC   5 mg, Oral, Daily      ARIPiprazole 10 MG tablet  Commonly known as:  ABILIFY   10 mg, Oral, Daily      atorvastatin 10 MG tablet  Commonly known as:  LIPITOR   10 mg, Oral, Daily      baclofen 10 MG tablet  Commonly known as:  LIORESAL   10 mg, Oral, 3 Times Daily      gabapentin 600 MG tablet  Commonly known as:  NEURONTIN   600 mg, Oral, 3 Times Daily      metoprolol tartrate 50 MG tablet  Commonly known as:  LOPRESSOR   50 mg, Oral, 2 Times Daily      pantoprazole 40 MG EC tablet  Commonly known as:  PROTONIX   40 mg, Oral, Daily      sertraline 100 MG tablet  Commonly known as:  ZOLOFT   150 mg, Oral, Daily       topiramate 50 MG tablet  Commonly known as:  TOPAMAX   50 mg, Oral, 2 Times Daily      traZODone 50 MG tablet  Commonly known as:  DESYREL   50 mg, Oral, Nightly PRN             Allergies   Allergen Reactions   • Ace Inhibitors Angioedema   • Aspirin Other (See Comments)     Bleeding    • Codeine Nausea And Vomiting and Dizziness   • Tape Other (See Comments)     Surgical Burns    • Tylenol [Acetaminophen] Other (See Comments)     Increasing of liver enzymes and pain         Discharge Disposition:  Home or Self Care    Discharge Diet:  Diet Order   Procedures   • Diet Clear Liquid       Discharge Activity:       CODE STATUS:    Code Status and Medical Interventions:   Ordered at: 03/17/20 0500     Code Status:    CPR     Medical Interventions (Level of Support Prior to Arrest):    Full       No future appointments.  Follow-up Information     Provider, No Known Follow up.    Why:  she may follow up with PMD of her choice on arrival to [place] or back home.   Contact information:  [facility] [address] [phone]                   Time Spent on Discharge:  Greater than 30 minutes      Dakota Wilkins MD  [facility]  03/17/20  4:28 PM

## 2020-03-17 NOTE — PROGRESS NOTES
Clinical Pharmacy Services: Medication History    Sidra Valencia is a 54 y.o. female presenting to [facility] for   Chief Complaint   Patient presents with    Angioedema       She  has a past medical history of Cancer (CMS/[facility]), Chronic kidney disease, Diabetes mellitus (CMS/HCC), Hypertension, Infectious viral hepatitis, and Obesity.    Allergies as of 03/17/2020 - Reviewed 03/17/2020   Allergen Reaction Noted    Ace inhibitors Angioedema 03/17/2020    Aspirin Other (See Comments) 03/17/2020    Codeine Nausea And Vomiting and Dizziness 03/17/2020    Tape Other (See Comments) 03/17/2020    Tylenol [acetaminophen] Other (See Comments) 03/17/2020       Medication information was obtained from: patient  Pharmacy and Phone Number:     Prior to Admission Medications       Prescriptions Last Dose Informant Patient Reported? Taking?    amLODIPine (NORVASC) 5 MG tablet 3/16/2020 Self Yes Yes    Take 5 mg by mouth Daily.    ARIPiprazole (ABILIFY) 10 MG tablet 3/16/2020 Self Yes Yes    Take 10 mg by mouth Daily.    atorvastatin (LIPITOR) 10 MG tablet 3/16/2020 Self Yes Yes    Take 10 mg by mouth Daily.    baclofen (LIORESAL) 10 MG tablet 3/16/2020 Self Yes Yes    Take 10 mg by mouth 3 (Three) Times a Day.    gabapentin (NEURONTIN) 600 MG tablet 3/16/2020 Self Yes Yes    Take 600 mg by mouth 3 (Three) Times a Day.    hydrALAZINE (APRESOLINE) 25 MG tablet 3/16/2020 Self Yes Yes    Take 25 mg by mouth 2 (Two) Times a Day.    metoprolol tartrate (LOPRESSOR) 50 MG tablet 3/16/2020 Self Yes Yes    Take 50 mg by mouth 2 (Two) Times a Day.    pantoprazole (PROTONIX) 40 MG EC tablet 3/16/2020 Self Yes Yes    Take 40 mg by mouth Daily.    sertraline (ZOLOFT) 100 MG tablet 3/16/2020 Self Yes Yes    Take 150 mg by mouth Daily.    topiramate (TOPAMAX) 50 MG tablet 3/16/2020 Self Yes Yes    Take 50 mg by mouth 2 (Two) Times a Day.    traZODone (DESYREL) 50 MG tablet 3/16/2020 Self Yes Yes    Take 50 mg by mouth At Night As  Needed.                Medication notes:     This medication list is complete to the best of my knowledge as of 3/17/2020    Please call if questions.    Barbara Mitchell [facility] [place]  Medication History Technician  [phone]    3/17/2020 14:57

## 2020-03-17 NOTE — ED PROVIDER NOTES
EMERGENCY DEPARTMENT ENCOUNTER    Room Number:  13/13  Date of encounter:  3/17/2020  PCP: Provider, No Known  Historian: patient      HPI:  Chief Complaint: angioedema  Context: Sidra Valencia is a 54 y.o. female with Hx of DM and HTN, who presents to the ED c/o angioedema to her upper that began about 4 hours PTA. She confirms that she is currently on Lisinopril and has been on it for years. The pt states that she initially noticed a tingling on her lips which was then followed by the swelling. Denies other swelling. Denies rash and SOA. Denies trouble swallowing. She denies any known allergic exposures today, but states that she ate tuna for the first time in a long time today. She has eaten tuna previously without a reaction. Pt did take one OTC benadryl, without any relief. Denies CP, fever, cough, and chills. Pt is from out of town. There are no other complaints.     PAST MEDICAL HISTORY  Active Ambulatory Problems     Diagnosis Date Noted   • No Active Ambulatory Problems     Resolved Ambulatory Problems     Diagnosis Date Noted   • No Resolved Ambulatory Problems     No Additional Past Medical History         PAST SURGICAL HISTORY  History reviewed. No pertinent surgical history.      FAMILY HISTORY  No family history on file.      SOCIAL HISTORY  Social History     Socioeconomic History   • Marital status: Single     Spouse name: Not on file   • Number of children: Not on file   • Years of education: Not on file   • Highest education level: Not on file         ALLERGIES  Aspirin; Codeine; Tape; and Tylenol [acetaminophen]        REVIEW OF SYSTEMS  Review of Systems   Constitutional: Negative for fever.   HENT: Negative for sore throat.         (+) upper lip swelling   Eyes: Negative.    Respiratory: Negative for cough and shortness of breath.    Cardiovascular: Negative for chest pain.   Gastrointestinal: Negative for abdominal pain, diarrhea and vomiting.   Genitourinary: Negative for dysuria.    Musculoskeletal: Negative for neck pain.   Skin: Negative for rash.   Allergic/Immunologic: Negative.    Neurological: Negative for weakness, numbness and headaches.   Hematological: Negative.    Psychiatric/Behavioral: Negative.    All other systems reviewed and are negative.     All systems reviewed and negative except for those discussed in HPI.       PHYSICAL EXAM    I have reviewed the triage vital signs and nursing notes.    ED Triage Vitals [03/17/20 0301]   Temp Heart Rate Resp BP SpO2   97.3 °F (36.3 °C) 73 18 -- 100 %      Temp src Heart Rate Source Patient Position BP Location FiO2 (%)   Tympanic -- -- -- --         Physical Exam   Constitutional: She is oriented to person, place, and time. No distress.   HENT:   Head: Normocephalic and atraumatic.   Mouth/Throat: Oropharynx is clear and moist. No posterior oropharyngeal edema.   Swelling to the superior lip. No tongue swelling.   Eyes: Pupils are equal, round, and reactive to light. EOM are normal.   Neck: Normal range of motion. Neck supple.   Cardiovascular: Normal rate, regular rhythm and normal heart sounds.   Pulmonary/Chest: Effort normal and breath sounds normal. No respiratory distress.   Abdominal: Soft. There is no tenderness. There is no rebound and no guarding.   Musculoskeletal: Normal range of motion. She exhibits no edema.   Neurological: She is alert and oriented to person, place, and time. She has normal sensation and normal strength.   Skin: Skin is warm and dry. No rash noted.   Psychiatric: Mood and affect normal.   Nursing note and vitals reviewed.    LAB RESULTS  Recent Results (from the past 24 hour(s))   Comprehensive Metabolic Panel    Collection Time: 03/17/20  3:26 AM   Result Value Ref Range    Glucose 105 (H) 65 - 99 mg/dL    BUN 16 6 - 20 mg/dL    Creatinine 1.29 (H) 0.57 - 1.00 mg/dL    Sodium 143 136 - 145 mmol/L    Potassium 4.2 3.5 - 5.2 mmol/L    Chloride 109 (H) 98 - 107 mmol/L    CO2 22.0 22.0 - 29.0 mmol/L     Calcium 8.9 8.6 - 10.5 mg/dL    Total Protein 7.0 6.0 - 8.5 g/dL    Albumin 4.00 3.50 - 5.20 g/dL    ALT (SGPT) 17 1 - 33 U/L    AST (SGOT) 21 1 - 32 U/L    Alkaline Phosphatase 114 39 - 117 U/L    Total Bilirubin 0.3 0.2 - 1.2 mg/dL    eGFR  African Amer 52 (L) >60 mL/min/1.73    Globulin 3.0 gm/dL    A/G Ratio 1.3 g/dL    BUN/Creatinine Ratio 12.4 7.0 - 25.0    Anion Gap 12.0 5.0 - 15.0 mmol/L   CBC Auto Differential    Collection Time: 03/17/20  3:26 AM   Result Value Ref Range    WBC 6.31 3.40 - 10.80 10*3/mm3    RBC 4.56 3.77 - 5.28 10*6/mm3    Hemoglobin 11.0 (L) 12.0 - 15.9 g/dL    Hematocrit 35.9 34.0 - 46.6 %    MCV 78.7 (L) 79.0 - 97.0 fL    MCH 24.1 (L) 26.6 - 33.0 pg    MCHC 30.6 (L) 31.5 - 35.7 g/dL    RDW 15.0 12.3 - 15.4 %    RDW-SD 42.5 37.0 - 54.0 fl    MPV 10.5 6.0 - 12.0 fL    Platelets 255 140 - 450 10*3/mm3    Neutrophil % 57.3 42.7 - 76.0 %    Lymphocyte % 29.6 19.6 - 45.3 %    Monocyte % 10.0 5.0 - 12.0 %    Eosinophil % 2.5 0.3 - 6.2 %    Basophil % 0.3 0.0 - 1.5 %    Immature Grans % 0.3 0.0 - 0.5 %    Neutrophils, Absolute 3.61 1.70 - 7.00 10*3/mm3    Lymphocytes, Absolute 1.87 0.70 - 3.10 10*3/mm3    Monocytes, Absolute 0.63 0.10 - 0.90 10*3/mm3    Eosinophils, Absolute 0.16 0.00 - 0.40 10*3/mm3    Basophils, Absolute 0.02 0.00 - 0.20 10*3/mm3    Immature Grans, Absolute 0.02 0.00 - 0.05 10*3/mm3    nRBC 0.0 0.0 - 0.2 /100 WBC       Ordered the above labs and independently reviewed the results.      PROCEDURES    Procedures      MEDICATIONS GIVEN IN ER    Medications   sodium chloride 0.9 % flush 10 mL (has no administration in time range)   sodium chloride 0.9 % flush 10 mL (has no administration in time range)   sodium chloride 0.9 % flush 10 mL (has no administration in time range)   bisacodyl (DULCOLAX) EC tablet 5 mg (has no administration in time range)   ondansetron (ZOFRAN) tablet 4 mg (has no administration in time range)     Or   ondansetron (ZOFRAN) injection 4 mg (has no  administration in time range)   aluminum-magnesium hydroxide-simethicone (MAALOX MAX) 400-400-40 MG/5ML suspension 15 mL (has no administration in time range)   diphenhydrAMINE (BENADRYL) capsule 25 mg (has no administration in time range)   methylPREDNISolone sodium succinate (SOLU-Medrol) injection 125 mg (125 mg Intravenous Given 3/17/20 0335)   famotidine (PEPCID) injection 20 mg (20 mg Intravenous Given 3/17/20 0335)         PROGRESS, DATA ANALYSIS, CONSULTS, AND MEDICAL DECISION MAKING    All labs have been independently reviewed by me.  All radiology studies have been reviewed by me and discussed with radiologist dictating the report.   EKG's independently viewed and interpreted by me.  Discussion below represents my analysis of pertinent findings related to patient's condition, differential diagnosis, treatment plan and final disposition.         0442- Rechecked pt. Pt is resting comfortably. 98% O2 on RA. She reports that the swelling has not gone down, but that her tingling has been reduced to her lip. However, she has begun to feel the tingling in her throat. I notified the pt of her unremarkable labs. Due to the tingling in her throat, I informed the pt that I would like to admit her for further observation of her Sx. Pt understands and agrees with the plan, all questions answered.    0445- Placed call to [facility].     0457- Discussed pt with SY Garcia for Dr. Gonsalez ([facility]). She agrees to admit the pt.   --  AS OF 05:37 VITALS:    BP - (!) 178/107  HR - 60  TEMP - 97.3 °F (36.3 °C) (Tympanic)  O2 SATS - 100%  --    DIAGNOSIS  Final diagnoses:   ACE inhibitor-aggravated angioedema, initial encounter         DISPOSITION  ADMISSION    Discussed treatment plan and reason for admission with pt/family and admitting physician.  Pt/family voiced understanding of the plan for admission for further testing/treatment as needed.     --  Documentation assistance provided by pauly Cespedes for Dr. Domo MD.   Information recorded by the scribe was done at my direction and has been verified and validated by me.     Poli Cespedes  03/17/20 0501       Patrick Oliveros MD  03/17/20 2067

## 2020-03-17 NOTE — PROGRESS NOTES
Discharge Planning Assessment  [facility]     Patient Name: Sidra Valencia  MRN: [id number]  Today's Date: 3/17/2020    Admit Date: 3/17/2020    Discharge Needs Assessment    No documentation.       Discharge Plan     Row Name 03/17/20 1301       Plan    Plan Comments  Entered room, introduced self and explained role. Patient verified she is from [place] and she and her fiance were traveling to [facility] when she developed symptoms; patient advised she googled hospitals and came here; Patient advises she ambulates with assist of cane; has difficulty walking up steps, but still drives; she is otherwise independent w/ADL's and assists with caregiving with her fiance. Her fiance is staying at a local hotel while she is being hospiitalized; If patient receives prescriptions upon d/c she would be interested in meds to bed. Patient's fiance will be picking her up in private vehicle upon d/c. No other d/c needs anticipated at this time. .me        Destination      Coordination has not been started for this encounter.      Durable Medical Equipment      Coordination has not been started for this encounter.      Dialysis/Infusion      Coordination has not been started for this encounter.      Home Medical Care      Coordination has not been started for this encounter.      Therapy      Coordination has not been started for this encounter.      Community Resources      Coordination has not been started for this encounter.          Demographic Summary     Row Name 03/17/20 1306       General Information    Admission Type  observation    Reason for Consult  discharge planning    Preferred Language  English     Used During This Interaction  no       Contact Information    Permission Granted to Share Info With          Functional Status     Row Name 03/17/20 1310       Functional Status    Usual Activity Tolerance  good    Current Activity Tolerance  good       Functional Status, IADL    Medications   independent    Meal Preparation  independent    Housekeeping  independent    Laundry  independent    Shopping  independent    IADL Comments  ambulates w/assist of cane       Mental Status    General Appearance WDL  WDL       Mental Status Summary    Recent Changes in Mental Status/Cognitive Functioning  no changes       Employment/    Employment Status  retired        Psychosocial    No documentation.       Abuse/Neglect    No documentation.       Legal    No documentation.       Substance Abuse    No documentation.       Patient Forms    No documentation.           Rosangela Rivers RN